# Patient Record
Sex: MALE | Race: BLACK OR AFRICAN AMERICAN | Employment: UNEMPLOYED | URBAN - METROPOLITAN AREA
[De-identification: names, ages, dates, MRNs, and addresses within clinical notes are randomized per-mention and may not be internally consistent; named-entity substitution may affect disease eponyms.]

---

## 2017-09-06 ENCOUNTER — HOSPITAL ENCOUNTER (OUTPATIENT)
Dept: OTHER | Age: 62
Discharge: HOME | End: 2017-09-06
Attending: INTERNAL MEDICINE | Admitting: INTERNAL MEDICINE

## 2017-09-06 DIAGNOSIS — M79.642 LEFT HAND PAIN: ICD-10-CM

## 2021-12-15 ENCOUNTER — APPOINTMENT (OUTPATIENT)
Dept: CT IMAGING | Age: 66
End: 2021-12-15
Payer: MEDICARE

## 2021-12-15 ENCOUNTER — APPOINTMENT (OUTPATIENT)
Dept: GENERAL RADIOLOGY | Age: 66
End: 2021-12-15
Payer: MEDICARE

## 2021-12-15 ENCOUNTER — HOSPITAL ENCOUNTER (EMERGENCY)
Age: 66
Discharge: HOME | End: 2021-12-15
Payer: MEDICARE

## 2021-12-15 VITALS
BODY MASS INDEX: 39.17 KG/M2 | OXYGEN SATURATION: 96 % | SYSTOLIC BLOOD PRESSURE: 165 MMHG | WEIGHT: 315 LBS | HEART RATE: 70 BPM | DIASTOLIC BLOOD PRESSURE: 96 MMHG | RESPIRATION RATE: 20 BRPM | HEIGHT: 75 IN | TEMPERATURE: 98.3 F

## 2021-12-15 DIAGNOSIS — H81.11 BENIGN PAROXYSMAL POSITIONAL VERTIGO OF RIGHT EAR: Primary | ICD-10-CM

## 2021-12-15 LAB
A/G RATIO: 1.1 (ref 1.1–2.2)
ALBUMIN SERPL-MCNC: 4 G/DL (ref 3.4–5)
ALP BLD-CCNC: 85 U/L (ref 40–129)
ALT SERPL-CCNC: 11 U/L (ref 10–40)
ANION GAP SERPL CALCULATED.3IONS-SCNC: 14 MMOL/L (ref 3–16)
AST SERPL-CCNC: 19 U/L (ref 15–37)
BASOPHILS ABSOLUTE: 0.1 K/UL (ref 0–0.2)
BASOPHILS RELATIVE PERCENT: 0.7 %
BILIRUB SERPL-MCNC: 0.4 MG/DL (ref 0–1)
BUN BLDV-MCNC: 30 MG/DL (ref 7–20)
CALCIUM SERPL-MCNC: 9.4 MG/DL (ref 8.3–10.6)
CHLORIDE BLD-SCNC: 105 MMOL/L (ref 99–110)
CO2: 19 MMOL/L (ref 21–32)
CREAT SERPL-MCNC: 1.6 MG/DL (ref 0.8–1.3)
EKG ATRIAL RATE: 68 BPM
EKG DIAGNOSIS: NORMAL
EKG P AXIS: 20 DEGREES
EKG P-R INTERVAL: 154 MS
EKG Q-T INTERVAL: 414 MS
EKG QRS DURATION: 78 MS
EKG QTC CALCULATION (BAZETT): 440 MS
EKG R AXIS: 65 DEGREES
EKG T AXIS: 168 DEGREES
EKG VENTRICULAR RATE: 68 BPM
EOSINOPHILS ABSOLUTE: 0.2 K/UL (ref 0–0.6)
EOSINOPHILS RELATIVE PERCENT: 1.4 %
GFR AFRICAN AMERICAN: 53
GFR NON-AFRICAN AMERICAN: 44
GLUCOSE BLD-MCNC: 120 MG/DL
GLUCOSE BLD-MCNC: 120 MG/DL (ref 70–99)
GLUCOSE BLD-MCNC: 127 MG/DL (ref 70–99)
HCT VFR BLD CALC: 45.9 % (ref 40.5–52.5)
HEMOGLOBIN: 15.4 G/DL (ref 13.5–17.5)
LIPASE: 56 U/L (ref 13–60)
LYMPHOCYTES ABSOLUTE: 2.1 K/UL (ref 1–5.1)
LYMPHOCYTES RELATIVE PERCENT: 19.6 %
MCH RBC QN AUTO: 31.6 PG (ref 26–34)
MCHC RBC AUTO-ENTMCNC: 33.5 G/DL (ref 31–36)
MCV RBC AUTO: 94.3 FL (ref 80–100)
MONOCYTES ABSOLUTE: 0.6 K/UL (ref 0–1.3)
MONOCYTES RELATIVE PERCENT: 5.7 %
NEUTROPHILS ABSOLUTE: 7.7 K/UL (ref 1.7–7.7)
NEUTROPHILS RELATIVE PERCENT: 72.6 %
PDW BLD-RTO: 13.3 % (ref 12.4–15.4)
PERFORMED ON: ABNORMAL
PLATELET # BLD: 265 K/UL (ref 135–450)
PMV BLD AUTO: 8.3 FL (ref 5–10.5)
POTASSIUM REFLEX MAGNESIUM: 4.4 MMOL/L (ref 3.5–5.1)
RBC # BLD: 4.87 M/UL (ref 4.2–5.9)
SODIUM BLD-SCNC: 138 MMOL/L (ref 136–145)
TOTAL PROTEIN: 7.7 G/DL (ref 6.4–8.2)
TROPONIN: <0.01 NG/ML
WBC # BLD: 10.5 K/UL (ref 4–11)

## 2021-12-15 PROCEDURE — 80053 COMPREHEN METABOLIC PANEL: CPT

## 2021-12-15 PROCEDURE — 96374 THER/PROPH/DIAG INJ IV PUSH: CPT

## 2021-12-15 PROCEDURE — 6360000002 HC RX W HCPCS: Performed by: PHYSICIAN ASSISTANT

## 2021-12-15 PROCEDURE — 70450 CT HEAD/BRAIN W/O DYE: CPT

## 2021-12-15 PROCEDURE — 6370000000 HC RX 637 (ALT 250 FOR IP): Performed by: PHYSICIAN ASSISTANT

## 2021-12-15 PROCEDURE — 93005 ELECTROCARDIOGRAM TRACING: CPT | Performed by: PHYSICIAN ASSISTANT

## 2021-12-15 PROCEDURE — 84484 ASSAY OF TROPONIN QUANT: CPT

## 2021-12-15 PROCEDURE — 85025 COMPLETE CBC W/AUTO DIFF WBC: CPT

## 2021-12-15 PROCEDURE — 83690 ASSAY OF LIPASE: CPT

## 2021-12-15 PROCEDURE — 93010 ELECTROCARDIOGRAM REPORT: CPT | Performed by: INTERNAL MEDICINE

## 2021-12-15 PROCEDURE — 99284 EMERGENCY DEPT VISIT MOD MDM: CPT

## 2021-12-15 PROCEDURE — 2580000003 HC RX 258: Performed by: PHYSICIAN ASSISTANT

## 2021-12-15 PROCEDURE — 71045 X-RAY EXAM CHEST 1 VIEW: CPT

## 2021-12-15 RX ORDER — MECLIZINE HYDROCHLORIDE 50 MG/1
50 TABLET ORAL 3 TIMES DAILY PRN
Qty: 30 TABLET | Refills: 0 | Status: SHIPPED | OUTPATIENT
Start: 2021-12-15 | End: 2021-12-25

## 2021-12-15 RX ORDER — MECLIZINE HCL 12.5 MG/1
25 TABLET ORAL ONCE
Status: COMPLETED | OUTPATIENT
Start: 2021-12-15 | End: 2021-12-15

## 2021-12-15 RX ORDER — ONDANSETRON 4 MG/1
4 TABLET, ORALLY DISINTEGRATING ORAL EVERY 8 HOURS PRN
Qty: 20 TABLET | Refills: 0 | Status: SHIPPED | OUTPATIENT
Start: 2021-12-15

## 2021-12-15 RX ORDER — FLUTICASONE PROPIONATE 50 MCG
2 SPRAY, SUSPENSION (ML) NASAL DAILY
Qty: 16 G | Refills: 0 | Status: SHIPPED | OUTPATIENT
Start: 2021-12-15

## 2021-12-15 RX ORDER — 0.9 % SODIUM CHLORIDE 0.9 %
1000 INTRAVENOUS SOLUTION INTRAVENOUS ONCE
Status: COMPLETED | OUTPATIENT
Start: 2021-12-15 | End: 2021-12-15

## 2021-12-15 RX ORDER — ONDANSETRON 2 MG/ML
4 INJECTION INTRAMUSCULAR; INTRAVENOUS ONCE
Status: COMPLETED | OUTPATIENT
Start: 2021-12-15 | End: 2021-12-15

## 2021-12-15 RX ADMIN — MECLIZINE 25 MG: 12.5 TABLET ORAL at 12:40

## 2021-12-15 RX ADMIN — ONDANSETRON 4 MG: 2 INJECTION INTRAMUSCULAR; INTRAVENOUS at 12:40

## 2021-12-15 RX ADMIN — SODIUM CHLORIDE 1000 ML: 9 INJECTION, SOLUTION INTRAVENOUS at 12:40

## 2021-12-15 ASSESSMENT — PAIN SCALES - GENERAL: PAINLEVEL_OUTOF10: 0

## 2021-12-15 NOTE — ED NOTES
Pt arrived to room 15 from triage, pt states that he woke up this am feeling dizzy, pt states \"feel like the room is spinning\" pt is alert and orient vss afebrile, pt ambulated to the bed from wheelchair with a steady gait, pt lives at home with family and does not use a walker or cane to ambulate, pt denies pain     Glenis Cheney RN  12/15/21 4618

## 2021-12-15 NOTE — ED PROVIDER NOTES
[facility]        Pt Name: Caroline Lewis  MRN: [id number]  [place] 1955  Date of evaluation: 12/15/2021  Provider: Alyssa Valero PA-C  PCP: Janie Blackwood MD  Note Started: 2:10 PM EST      ZEB. I have evaluated this patient. My supervising physician was available for consultation. Triage CHIEF COMPLAINT       Chief Complaint   Patient presents with    Dizziness     started when he woke up at 0700    Nausea     has not thrown up         HISTORY OF PRESENT ILLNESS   (Location/Symptom, Timing/Onset, Context/Setting, Quality, Duration, Modifying Factors, Severity)  Note limiting factors. Chief Complaint: Dizziness and nausea without vomiting just today at 7 AM.    Caroline Lewis is a 61 y.o. male who presents stating that he was doing fine yesterday through the day and went to bed night yesterday as normal.  However when he started to get up this morning he states that became very dizzy and nauseated in both the symptoms have persisted worse with changing resistance and better at rest ever since. No recent runny or stuffy nose or cough or congestion compared to usual.  No headache vision change neck pain or stiffness shortness of breath or chest pain heart palpitations. Patient is diabetic and uses oral medications to control his glucose and does not check his glucose. No vomiting or diarrhea or belly pain or extremity acute weakness or loss of sensation or movement or rash. Patient decided to come in to be seen and evaluated and treated in the ER because his symptoms are quite bothersome. Nursing Notes were all reviewed and agreed with or any disagreements were addressed in the HPI. REVIEW OF SYSTEMS    (2-9 systems for level 4, 10 or more for level 5)     Review of Systems  Positive straight as above with feeling like the room spins when he is changing position and settles down when he sits still. No headache or vision changes neck pain or stiffness. No shortness of breath or chest pain or heart palpitations. Positive for nausea with no vomiting and no diarrhea, no abdominal pain or distention or tenderness. No burning in urination or difficult passing urine or acute stool changes. No extremity weakness or loss of sensation or movement or coordination however patient feels that the dizziness makes it more difficult to ambulate. No rash. No difficulty speaking or understanding speech. PAST MEDICAL HISTORY     Past Medical History:   Diagnosis Date    Gout     Hyperlipidemia     Hypertension     Low testosterone     Osteoarthritis of left hip     Vitamin D deficiency        SURGICAL HISTORY   History reviewed. No pertinent surgical history. CURRENTMEDICATIONS       Previous Medications    ALLOPURINOL (ZYLOPRIM) 300 MG TABLET    Take 1 tablet by mouth daily    AMLODIPINE (NORVASC) 5 MG TABLET    Take 1 tablet by mouth daily    ATENOLOL (TENORMIN) 50 MG TABLET    Take 2 tablets by mouth daily    CELECOXIB (CELEBREX) 200 MG CAPSULE    Take 1 capsule by mouth 2 times daily    INDOMETHACIN (INDOCIN) 50 MG CAPSULE    Take 1 capsule by mouth 3 times daily    LISINOPRIL (PRINIVIL;ZESTRIL) 20 MG TABLET    Take 1.5 tablets by mouth daily    TADALAFIL (CIALIS) 20 MG TABLET    Take 1 tablet by mouth as needed for Erectile Dysfunction    VITAMIN D (ERGOCALCIFEROL) 66304 UNITS CAPS CAPSULE    Take 1 capsule by mouth once a week       ALLERGIES     Patient has no known allergies. FAMILYHISTORY     History reviewed. No pertinent family history.      SOCIAL HISTORY       Social History     Socioeconomic History    Marital status:      Spouse name: None    Number of children: None    Years of education: None    Highest education level: None   Occupational History    None   Tobacco Use    Smoking status: Never Smoker    Smokeless tobacco: Never Used   Substance and Sexual Activity    Alcohol use: No    Drug use: No    Sexual activity: Yes     Partners: Female   Other Topics Concern    None   Social History Narrative    None     Social Determinants of Health     Financial Resource Strain:     Difficulty of Paying Living Expenses: Not on file   Food Insecurity:     Worried About Running Out of Food in the Last Year: Not on file    Shazia of Food in the Last Year: Not on file   Transportation Needs:     Lack of Transportation (Medical): Not on file    Lack of Transportation (Non-Medical):  Not on file   Physical Activity:     Days of Exercise per Week: Not on file    Minutes of Exercise per Session: Not on file   Stress:     Feeling of Stress : Not on file   Social Connections:     Frequency of Communication with Friends and Family: Not on file    Frequency of Social Gatherings with Friends and Family: Not on file    Attends Muslim Services: Not on file    Active Member of [address] [place] [address] [employer] or Organizations: Not on file    Attends Club or Organization Meetings: Not on file    Marital Status: Not on file   Intimate Partner Violence:     Fear of Current or Ex-Partner: Not on file    Emotionally Abused: Not on file    Physically Abused: Not on file    Sexually Abused: Not on file   Housing Stability:     Unable to Pay for Housing in the Last Year: Not on file    Number of [place] in the Last Year: Not on file    Unstable Housing in the Last Year: Not on file       SCREENINGS    Giulia Coma Scale  Eye Opening: Spontaneous  Best Verbal Response: Oriented  Best Motor Response: Obeys commands  Giulia Coma Scale Score: 15        PHYSICAL EXAM    (up to 7 for level 4, 8 or more for level 5)     ED Triage Vitals   BP Temp Temp Source Pulse Resp SpO2 Height Weight   12/15/21 1119 12/15/21 1119 12/15/21 1119 12/15/21 1119 12/15/21 1121 12/15/21 1119 12/15/21 1119 12/15/21 1200   (!) 165/96 98.3 °F (36.8 °C) Temporal 70 20 96 % 6' 3\" (1.905 m) (!) 333 lb 12.4 oz (151.4 kg)       Physical for horizontal nystagmus elicited in both eyes with nausea and vomiting with rapid head tilt from right to left initially. The nystagmus settles down within about 20 seconds. Psychiatric:         Mood and Affect: Mood normal.         Behavior: Behavior normal.         DIAGNOSTIC RESULTS   LABS:    Labs Reviewed   COMPREHENSIVE METABOLIC PANEL W/ REFLEX TO MG FOR LOW K - Abnormal; Notable for the following components:       Result Value    CO2 19 (*)     Glucose 127 (*)     BUN 30 (*)     CREATININE 1.6 (*)     GFR Non- 44 (*)     GFR  48 (*)     All other components within normal limits    Narrative:     Performed at:  [facility]  [address], [facility] [place] [address]   Phone [phone]   POCT GLUCOSE - Abnormal; Notable for the following components:    POC Glucose 120 (*)     All other components within normal limits    Narrative:     Performed at:  [place] [address], [facility] [place] [address]   Phone [phone]   POCT GLUCOSE - Normal   CBC WITH AUTO DIFFERENTIAL    Narrative:     Performed at:  [address] [facility] [place] [address]   Phone [phone]   TROPONIN    Narrative:     Performed at:  [facility]  [address] [place] [address] [facility] [place] [address]   Phone [phone]   LIPASE    Narrative:     Performed at:  [facility]  [address] [place] [address] [place] [address]   Phone [phone]       When ordered, only abnormal lab results are displayed. All other labs were within normal range or not returned as of this dictation. EKG: When ordered, EKG's are interpreted by the Emergency Department Physician in the absence of a cardiologist.  Please see their note for interpretation of EKG.       RADIOLOGY:   Non-plain film images such as CT, Ultrasound and MRI are read by the yves Roblero radiographic images are visualized andpreliminarily interpreted by the  ED Provider with the below findings:        Interpretation [facility] Radiologist below, if available at the time of this note:    CT HEAD WO CONTRAST   Final Result   No acute intracranial abnormality. RECOMMENDATIONS:   Unavailable         XR CHEST PORTABLE   Final Result   No acute process. CT HEAD WO CONTRAST    Result Date: 12/15/2021  EXAMINATION: CT OF THE HEAD WITHOUT CONTRAST  12/15/2021 12:38 pm TECHNIQUE: CT of the head was performed without the administration of intravenous contrast. Dose modulation, iterative reconstruction, and/or weight based adjustment of the mA/kV was utilized to reduce the radiation dose to as low as reasonably achievable. COMPARISON: None. HISTORY: ORDERING SYSTEM PROVIDED HISTORY: dizziness TECHNOLOGIST PROVIDED HISTORY: Reason for exam:->dizziness Has a \"code stroke\" or \"stroke alert\" been called? ->No Decision Support Exception - unselect if not a suspected or confirmed emergency medical condition->Emergency Medical Condition (MA) Reason for Exam: dizzy FINDINGS: BRAIN/VENTRICLES: The ventricles and sulci are prominent. Pattern is consistent with age-related atrophy. No extra-axial fluid collections, and no sign of recent intracranial hemorrhage. Decreased attenuation is noted within the periventricular white matter. Pattern is consistent with chronic small vessel ischemic change. No acute edema or mass effect. No mass lesions are detected. ORBITS: The visualized portion of the orbits demonstrate no acute abnormality. SINUSES: The visualized paranasal sinuses and mastoid air cells demonstrate no acute abnormality. SOFT TISSUES/SKULL:  No acute abnormality of the visualized skull or soft tissues. No acute intracranial abnormality.  RECOMMENDATIONS: Unavailable     XR CHEST PORTABLE    Result Date: 12/15/2021  EXAMINATION: ONE XRAY VIEW OF THE CHEST 12/15/2021 12:03 pm COMPARISON: 10/16/2012 HISTORY: ORDERING SYSTEM PROVIDED HISTORY: other TECHNOLOGIST PROVIDED HISTORY: Reason for exam:->other Reason for Exam: dizziness,nausea,sob FINDINGS: The lungs are without acute focal process. There is no effusion or pneumothorax. The cardiomediastinal silhouette is without acute process. The osseous structures are without acute process. No acute process. PROCEDURES   Unless otherwise noted below, none     Procedures    CRITICAL CARE TIME   N/A    CONSULTS:  None      EMERGENCY DEPARTMENT COURSE and DIFFERENTIAL DIAGNOSIS/MDM:   Vitals:    Vitals:    12/15/21 1119 12/15/21 1121 12/15/21 1200   BP: (!) 165/96     Pulse: 70     Resp:  20    Temp: 98.3 °F (36.8 °C)     TempSrc: Temporal     SpO2: 96%     Weight:   (!) 333 lb 12.4 oz (151.4 kg)   Height: 6' 3\" (1.905 m)         Patient was given thefollowing medications:  Medications   0.9 % sodium chloride bolus (1,000 mLs IntraVENous New Bag 12/15/21 1240)   ondansetron (ZOFRAN) injection 4 mg (4 mg IntraVENous Given 12/15/21 1240)   meclizine (ANTIVERT) tablet 25 mg (25 mg Oral Given 12/15/21 1240)         Patient presents as above and evaluation and treatment is begun. After exam, medications given for comfort including some IV fluids as well. Patient based on the lab work does have some ongoing kidney disease seen in the electronic medical record with today's BUN and creatinine not significantly worse than usual.  Patient does not elevated troponin according to the labs and has no acute EKG changes of concerned from the previous EKG on file per ED physician. Please see that note for details. CBC normal as well as lipase. Glucose not concerning at this time in this diabetic patient. CT head plain as well as 1 view chest x-ray obtained and also returned as above. On repeat exam patient symptoms are significantly improved after the antinausea medication and IV fluids and meclizine.   Patient's signs symptoms and findings here are all consistent with peripheral benign paroxysmal positional vertigo of the right ear. We will write for supportive medications at home. Patient is educated concerning the natural course of this issue and we will also be given follow-up instructions and other supportive information that may help his symptoms resolved. He verbalizes understanding and agreement with the above and the following discharge home plan. Home in stable condition to drink plenty of water, use medications as written, and rest.  Slowly advance activity as tolerated and consider trying the Epley maneuver at home. Monitor for gradual improvement over the next several days and follow-up outpatient with family doctor and ENT for further care and treatment if needed. Return to the emergency department for any emergency worsening or concern     FINAL IMPRESSION      1. Benign paroxysmal positional vertigo of right ear          DISPOSITION/PLAN   DISPOSITION Discharge - Pending Orders Complete 12/15/2021 02:10:45 PM      PATIENT REFERREDTO:  [address] [place] [address] ENT  [address] [place] [address] Drive. [facility] [address]  Schedule an appointment as soon as possible for a visit       Efraín Mohan MD  [address] [place] [address]  [phone]    Call         DISCHARGE MEDICATIONS:  New Prescriptions    FLUTICASONE (FLONASE) 50 MCG/ACT NASAL SPRAY    2 sprays by Each Nostril route daily for the first 3 days, then decrease to 1 spray in each nostril once daily.     MECLIZINE (ANTIVERT) 50 MG TABLET    Take 1 tablet by mouth 3 times daily as needed for Dizziness    ONDANSETRON (ZOFRAN ODT) 4 MG DISINTEGRATING TABLET    Take 1 tablet by mouth every 8 hours as needed for Nausea       DISCONTINUED MEDICATIONS:  Discontinued Medications    No medications on file              (Please note that portions ofthis note were completed with a voice recognition program.  Efforts were made to edit the dictations but occasionally words are

## 2021-12-15 NOTE — ED PROVIDER NOTES
EKG: Normal sinus rhythm, rate of 68, inferior lateral ischemic changes. Rhythm strip shows a sinus rhythm with a rate of 68, IA interval 154 ms, QRS 78 ms with no other ectopy as interpreted by me. This compared to an EKG dated 10/15/2013, no significant change noted.       Dipesh Fregoso MD  12/15/21 6183

## 2021-12-15 NOTE — ED NOTES
Pt discharged at this time. Discharge instructions and medications reviewed,  Questions were answered. PT verbalized understanding. VSS, Afebrile. Follow up appointments were discussed.          Morena Hu RN  12/15/21 9570

## 2021-12-15 NOTE — ED NOTES
Pt resting in bed at this time, laying in a supine position with head of bed elevated . Call light remains in reach instructed pt how to use, and encouraged pt to call if needed assistance, no distress noted. RR even and unlabored, skin warm and dry. No needs at this time. Will continue to monitor closely.          Ronni Goins RN  12/15/21 3063

## 2021-12-15 NOTE — ED NOTES
Pt resting in bed at this time, laying in a supine position with head of bed elevated . Call light remains in reach instructed pt how to use, and encouraged pt to call if needed assistance, no distress noted. RR even and unlabored, skin warm and dry. No needs at this time. Will continue to monitor closely.          Teena Miranda RN  12/15/21 2292

## 2025-05-14 ENCOUNTER — ANESTHESIA (OUTPATIENT)
Dept: OPERATING ROOM | Age: 70
End: 2025-05-14
Payer: MEDICARE

## 2025-05-14 ENCOUNTER — ANESTHESIA EVENT (OUTPATIENT)
Dept: OPERATING ROOM | Age: 70
End: 2025-05-14
Payer: MEDICARE

## 2025-05-14 ENCOUNTER — HOSPITAL ENCOUNTER (EMERGENCY)
Age: 70
Discharge: HOME | End: 2025-05-14
Payer: MEDICARE

## 2025-05-14 ENCOUNTER — APPOINTMENT (OUTPATIENT)
Dept: GENERAL RADIOLOGY | Age: 70
End: 2025-05-14
Payer: MEDICARE

## 2025-05-14 VITALS
TEMPERATURE: 96.9 F | BODY MASS INDEX: 38.48 KG/M2 | HEART RATE: 81 BPM | OXYGEN SATURATION: 98 % | DIASTOLIC BLOOD PRESSURE: 81 MMHG | SYSTOLIC BLOOD PRESSURE: 137 MMHG | RESPIRATION RATE: 15 BRPM | WEIGHT: 299.83 LBS | HEIGHT: 74 IN

## 2025-05-14 DIAGNOSIS — T17.208A FOREIGN BODY IN THROAT, INITIAL ENCOUNTER: Primary | ICD-10-CM

## 2025-05-14 LAB
ANION GAP SERPL CALCULATED.3IONS-SCNC: 10 MMOL/L (ref 3–16)
BASOPHILS # BLD: 0.1 K/UL (ref 0–0.2)
BASOPHILS NFR BLD: 1 %
BUN SERPL-MCNC: 28 MG/DL (ref 7–20)
CALCIUM SERPL-MCNC: 8.9 MG/DL (ref 8.3–10.6)
CHLORIDE SERPL-SCNC: 107 MMOL/L (ref 99–110)
CO2 SERPL-SCNC: 19 MMOL/L (ref 21–32)
CREAT SERPL-MCNC: 2 MG/DL (ref 0.8–1.3)
DEPRECATED RDW RBC AUTO: 13.9 % (ref 12.4–15.4)
EOSINOPHIL # BLD: 0.3 K/UL (ref 0–0.6)
EOSINOPHIL NFR BLD: 4.4 %
GFR SERPLBLD CREATININE-BSD FMLA CKD-EPI: 36 ML/MIN/{1.73_M2}
GLUCOSE SERPL-MCNC: 118 MG/DL (ref 70–99)
HCT VFR BLD AUTO: 40.3 % (ref 40.5–52.5)
HGB BLD-MCNC: 13.8 G/DL (ref 13.5–17.5)
LYMPHOCYTES # BLD: 2.3 K/UL (ref 1–5.1)
LYMPHOCYTES NFR BLD: 29.2 %
MCH RBC QN AUTO: 32.1 PG (ref 26–34)
MCHC RBC AUTO-ENTMCNC: 34.3 G/DL (ref 31–36)
MCV RBC AUTO: 93.7 FL (ref 80–100)
MONOCYTES # BLD: 0.7 K/UL (ref 0–1.3)
MONOCYTES NFR BLD: 9.2 %
NEUTROPHILS # BLD: 4.3 K/UL (ref 1.7–7.7)
NEUTROPHILS NFR BLD: 56.2 %
PLATELET # BLD AUTO: 210 K/UL (ref 135–450)
PMV BLD AUTO: 8.4 FL (ref 5–10.5)
POTASSIUM SERPL-SCNC: 4.4 MMOL/L (ref 3.5–5.1)
RBC # BLD AUTO: 4.3 M/UL (ref 4.2–5.9)
SODIUM SERPL-SCNC: 136 MMOL/L (ref 136–145)
WBC # BLD AUTO: 7.7 K/UL (ref 4–11)

## 2025-05-14 PROCEDURE — 36415 COLL VENOUS BLD VENIPUNCTURE: CPT

## 2025-05-14 PROCEDURE — 3600000004 HC SURGERY LEVEL 4 BASE: Performed by: STUDENT IN AN ORGANIZED HEALTH CARE EDUCATION/TRAINING PROGRAM

## 2025-05-14 PROCEDURE — 6360000002 HC RX W HCPCS: Performed by: NURSE ANESTHETIST, CERTIFIED REGISTERED

## 2025-05-14 PROCEDURE — 7100000010 HC PHASE II RECOVERY - FIRST 15 MIN: Performed by: STUDENT IN AN ORGANIZED HEALTH CARE EDUCATION/TRAINING PROGRAM

## 2025-05-14 PROCEDURE — 2580000003 HC RX 258: Performed by: NURSE ANESTHETIST, CERTIFIED REGISTERED

## 2025-05-14 PROCEDURE — 80048 BASIC METABOLIC PNL TOTAL CA: CPT

## 2025-05-14 PROCEDURE — 70360 X-RAY EXAM OF NECK: CPT

## 2025-05-14 PROCEDURE — 85025 COMPLETE CBC W/AUTO DIFF WBC: CPT

## 2025-05-14 PROCEDURE — 3700000000 HC ANESTHESIA ATTENDED CARE: Performed by: STUDENT IN AN ORGANIZED HEALTH CARE EDUCATION/TRAINING PROGRAM

## 2025-05-14 PROCEDURE — 99285 EMERGENCY DEPT VISIT HI MDM: CPT | Performed by: STUDENT IN AN ORGANIZED HEALTH CARE EDUCATION/TRAINING PROGRAM

## 2025-05-14 PROCEDURE — 7100000000 HC PACU RECOVERY - FIRST 15 MIN: Performed by: STUDENT IN AN ORGANIZED HEALTH CARE EDUCATION/TRAINING PROGRAM

## 2025-05-14 PROCEDURE — 2500000003 HC RX 250 WO HCPCS: Performed by: NURSE ANESTHETIST, CERTIFIED REGISTERED

## 2025-05-14 PROCEDURE — 3600000014 HC SURGERY LEVEL 4 ADDTL 15MIN: Performed by: STUDENT IN AN ORGANIZED HEALTH CARE EDUCATION/TRAINING PROGRAM

## 2025-05-14 PROCEDURE — 3700000001 HC ADD 15 MINUTES (ANESTHESIA): Performed by: STUDENT IN AN ORGANIZED HEALTH CARE EDUCATION/TRAINING PROGRAM

## 2025-05-14 PROCEDURE — 31530 LARYNGOSCOPY W/FB REMOVAL: CPT | Performed by: STUDENT IN AN ORGANIZED HEALTH CARE EDUCATION/TRAINING PROGRAM

## 2025-05-14 PROCEDURE — 99284 EMERGENCY DEPT VISIT MOD MDM: CPT

## 2025-05-14 PROCEDURE — 7100000001 HC PACU RECOVERY - ADDTL 15 MIN: Performed by: STUDENT IN AN ORGANIZED HEALTH CARE EDUCATION/TRAINING PROGRAM

## 2025-05-14 PROCEDURE — 2709999900 HC NON-CHARGEABLE SUPPLY: Performed by: STUDENT IN AN ORGANIZED HEALTH CARE EDUCATION/TRAINING PROGRAM

## 2025-05-14 RX ORDER — LIDOCAINE HYDROCHLORIDE 20 MG/ML
INJECTION, SOLUTION EPIDURAL; INFILTRATION; INTRACAUDAL; PERINEURAL
Status: DISCONTINUED | OUTPATIENT
Start: 2025-05-14 | End: 2025-05-14 | Stop reason: SDUPTHER

## 2025-05-14 RX ORDER — FENTANYL CITRATE 50 UG/ML
50 INJECTION, SOLUTION INTRAMUSCULAR; INTRAVENOUS EVERY 5 MIN PRN
Status: DISCONTINUED | OUTPATIENT
Start: 2025-05-14 | End: 2025-05-14 | Stop reason: HOSPADM

## 2025-05-14 RX ORDER — ONDANSETRON 2 MG/ML
4 INJECTION INTRAMUSCULAR; INTRAVENOUS
Status: DISCONTINUED | OUTPATIENT
Start: 2025-05-14 | End: 2025-05-14 | Stop reason: HOSPADM

## 2025-05-14 RX ORDER — SODIUM CHLORIDE 9 MG/ML
INJECTION, SOLUTION INTRAVENOUS PRN
Status: DISCONTINUED | OUTPATIENT
Start: 2025-05-14 | End: 2025-05-14 | Stop reason: HOSPADM

## 2025-05-14 RX ORDER — DIPHENHYDRAMINE HYDROCHLORIDE 50 MG/ML
12.5 INJECTION, SOLUTION INTRAMUSCULAR; INTRAVENOUS
Status: DISCONTINUED | OUTPATIENT
Start: 2025-05-14 | End: 2025-05-14 | Stop reason: HOSPADM

## 2025-05-14 RX ORDER — GLYCOPYRROLATE 0.2 MG/ML
INJECTION INTRAMUSCULAR; INTRAVENOUS
Status: DISCONTINUED | OUTPATIENT
Start: 2025-05-14 | End: 2025-05-14 | Stop reason: SDUPTHER

## 2025-05-14 RX ORDER — NALOXONE HYDROCHLORIDE 0.4 MG/ML
INJECTION, SOLUTION INTRAMUSCULAR; INTRAVENOUS; SUBCUTANEOUS PRN
Status: DISCONTINUED | OUTPATIENT
Start: 2025-05-14 | End: 2025-05-14 | Stop reason: HOSPADM

## 2025-05-14 RX ORDER — SUCCINYLCHOLINE/SOD CL,ISO/PF 200MG/10ML
SYRINGE (ML) INTRAVENOUS
Status: DISCONTINUED | OUTPATIENT
Start: 2025-05-14 | End: 2025-05-14 | Stop reason: SDUPTHER

## 2025-05-14 RX ORDER — SODIUM CHLORIDE 9 MG/ML
INJECTION, SOLUTION INTRAVENOUS
Status: DISCONTINUED | OUTPATIENT
Start: 2025-05-14 | End: 2025-05-14 | Stop reason: SDUPTHER

## 2025-05-14 RX ORDER — SODIUM CHLORIDE 0.9 % (FLUSH) 0.9 %
5-40 SYRINGE (ML) INJECTION PRN
Status: DISCONTINUED | OUTPATIENT
Start: 2025-05-14 | End: 2025-05-14 | Stop reason: HOSPADM

## 2025-05-14 RX ORDER — ONDANSETRON 2 MG/ML
INJECTION INTRAMUSCULAR; INTRAVENOUS
Status: DISCONTINUED | OUTPATIENT
Start: 2025-05-14 | End: 2025-05-14 | Stop reason: SDUPTHER

## 2025-05-14 RX ORDER — DEXAMETHASONE SODIUM PHOSPHATE 4 MG/ML
INJECTION, SOLUTION INTRA-ARTICULAR; INTRALESIONAL; INTRAMUSCULAR; INTRAVENOUS; SOFT TISSUE
Status: DISCONTINUED | OUTPATIENT
Start: 2025-05-14 | End: 2025-05-14 | Stop reason: SDUPTHER

## 2025-05-14 RX ORDER — LORAZEPAM 2 MG/ML
0.5 INJECTION INTRAMUSCULAR
Status: DISCONTINUED | OUTPATIENT
Start: 2025-05-14 | End: 2025-05-14 | Stop reason: HOSPADM

## 2025-05-14 RX ORDER — HALOPERIDOL 5 MG/ML
1 INJECTION INTRAMUSCULAR
Status: DISCONTINUED | OUTPATIENT
Start: 2025-05-14 | End: 2025-05-14 | Stop reason: HOSPADM

## 2025-05-14 RX ORDER — SODIUM CHLORIDE 0.9 % (FLUSH) 0.9 %
5-40 SYRINGE (ML) INJECTION EVERY 12 HOURS SCHEDULED
Status: DISCONTINUED | OUTPATIENT
Start: 2025-05-14 | End: 2025-05-14 | Stop reason: HOSPADM

## 2025-05-14 RX ORDER — PROPOFOL 10 MG/ML
INJECTION, EMULSION INTRAVENOUS
Status: DISCONTINUED | OUTPATIENT
Start: 2025-05-14 | End: 2025-05-14 | Stop reason: SDUPTHER

## 2025-05-14 RX ADMIN — GLYCOPYRROLATE 0.2 MG: 0.2 INJECTION INTRAMUSCULAR; INTRAVENOUS at 19:14

## 2025-05-14 RX ADMIN — SODIUM CHLORIDE: 9 INJECTION, SOLUTION INTRAVENOUS at 19:07

## 2025-05-14 RX ADMIN — ONDANSETRON 4 MG: 2 INJECTION, SOLUTION INTRAMUSCULAR; INTRAVENOUS at 19:14

## 2025-05-14 RX ADMIN — PROPOFOL 100 MG: 10 INJECTION, EMULSION INTRAVENOUS at 19:13

## 2025-05-14 RX ADMIN — PROPOFOL 100 MG: 10 INJECTION, EMULSION INTRAVENOUS at 19:16

## 2025-05-14 RX ADMIN — LIDOCAINE HYDROCHLORIDE 60 MG: 20 INJECTION, SOLUTION EPIDURAL; INFILTRATION; INTRACAUDAL; PERINEURAL at 19:12

## 2025-05-14 RX ADMIN — DEXAMETHASONE SODIUM PHOSPHATE 8 MG: 4 INJECTION, SOLUTION INTRAMUSCULAR; INTRAVENOUS at 19:14

## 2025-05-14 RX ADMIN — Medication 200 MG: at 19:12

## 2025-05-14 RX ADMIN — PROPOFOL 200 MG: 10 INJECTION, EMULSION INTRAVENOUS at 19:12

## 2025-05-14 ASSESSMENT — PAIN SCALES - GENERAL
PAINLEVEL_OUTOF10: 0
PAINLEVEL_OUTOF10: 5
PAINLEVEL_OUTOF10: 0
PAINLEVEL_OUTOF10: 0

## 2025-05-14 ASSESSMENT — PAIN - FUNCTIONAL ASSESSMENT
PAIN_FUNCTIONAL_ASSESSMENT: 0-10
PAIN_FUNCTIONAL_ASSESSMENT: 0-10

## 2025-05-14 ASSESSMENT — PAIN DESCRIPTION - LOCATION: LOCATION: THROAT

## 2025-05-14 ASSESSMENT — ENCOUNTER SYMPTOMS: SHORTNESS OF BREATH: 0

## 2025-05-14 ASSESSMENT — LIFESTYLE VARIABLES: SMOKING_STATUS: 0

## 2025-05-14 NOTE — ED PROVIDER NOTES
[facility]  EMERGENCY DEPARTMENT ENCOUNTER        Pt Name: Dino Nguyen  MRN: [id number]  Birthdate 1955  Date of evaluation: 5/14/2025  Provider: MAKEDA Harris  PCP: Madi Ny MD  Note Started: 4:56 PM EDT 5/14/25      ZEB. I have evaluated this patient.        CHIEF COMPLAINT       Chief Complaint   Patient presents with    Bone Stuck in Throat     Pt swallowed part of fish bone around 10 minutes ago. Pt says that bone is stuck on right side of throat and is making it painful to swallow. Pt denies shortness of breath and no signs of distress.        HISTORY OF PRESENT ILLNESS: 1 or more Elements     History From: patient  Limitations to history : None    Dino Nguyen is a 70 y.o. male who presents to the emergency room due to concern for fishbone stuck in his throat.  Patient states that he was eating fish earlier prior to arrival when he felt a bug stuck at the right side of his throat.  Feels a sharp stabbing type pain at times.  He is able to tolerate his oral secretions.  He did try to eat to try to dislodge the fishbone but states that is still present.  He denies any difficulty breathing or shortness of breath.  He denies any other symptoms at this time.    Nursing Notes were all reviewed and agreed with or any disagreements were addressed in the HPI.    REVIEW OF SYSTEMS :      Review of Systems   HENT:          Foreign body throat.       Positives and Pertinent negatives as per HPI.     SURGICAL HISTORY   History reviewed. No pertinent surgical history.    CURRENTMEDICATIONS       Discharge Medication List as of 5/14/2025  7:49 PM        CONTINUE these medications which have NOT CHANGED    Details   ondansetron (ZOFRAN ODT) 4 MG disintegrating tablet Take 1 tablet by mouth every 8 hours as needed for Nausea, Disp-20 tablet, R-0Normal      fluticasone (FLONASE) 50 MCG/ACT nasal spray 2 sprays by Each Nostril route daily for the first 3 days, then decrease to 1 spray in each

## 2025-05-14 NOTE — PROGRESS NOTES
Patient admitted to PACU # 4 from OR at 1933 post LARYNGOSCOPY DIRECT WITH FOREIGN BODY REMOVAL  per Dr Tello.  Attached to PACU monitoring system and report received from anesthesia provider.  Patient was reported to be hemodynamically stable during procedure.  Patient drowsy on admission and denied pain.

## 2025-05-14 NOTE — DISCHARGE INSTRUCTIONS
Eat a soft diet for 24 hours  Alternate Tylenol and ibuprofen for pain  It is normal to feel foreign body sensation in the throat after removal.  If it is persistent after the next 24 to 48 hours, please contact our office.    Other instructions:   Please go to the Emergency Room or call our clinic if you experience any of the following:   · worsening pain, nausea, or vomiting not relieved by medications.   · Temperature greater than 101.5? F.   · Chest pain, shortness of breath, persistent dizziness, swelling in one or both legs.  · Sudden changes in voice, swallowing, or vision.

## 2025-05-14 NOTE — OP NOTE
Operative Note      Patient: Dino Nguyen  YOB: 1955  MRN: [id number]    Date of Procedure: 5/14/2025    Pre-Op Diagnosis Codes:      *Airway foreign body    Post-Op Diagnosis: Same       Procedure(s):  LARYNGOSCOPY DIRECT WITH FOREIGN BODY REMOVAL    Surgeon(s):  Jj Tello MD    Assistant:   Surgical Assistant: Van Villa    Anesthesia: General    Estimated Blood Loss (mL): Minimal    Complications: None    Specimens:   * No specimens in log *    Implants:  * No implants in log *      Drains: * No LDAs found *    Findings:  Infection Present At Time Of Surgery (PATOS) (choose all levels that have infection present):  No infection present  Other Findings: Large 3 cm fishbone lodged in the right and left inferior tonsillar fossa crossing the vallecula.  Removed without difficulty.  No residual foreign bodies noted    Detailed Description of Procedure:   This is a 70-year-old gentleman who had concern for ingestion of a fishbone earlier today.  He had foreign body sensation in his throat.  He was taken urgently to the OR for evaluation and removal after informed consent was signed.    Patient was brought to the operating room and general anesthesia was obtained via endotracheal intubation.  I then performed direct laryngoscopy.  It was immediately apparent that there was a fishbone lodged between the 2 inferior aspects of the tonsils spanning the vallecula.  Under direct visualization the fishbone was gently removed.  There was minimal bleeding.  The rest of the upper aerodigestive tract was visualized and there was no evidence of further foreign bodies.  Patient tolerated the procedure well and was handed back to anesthesia in good condition.  There were no complications.    Electronically signed by Jj Tello MD on 5/14/2025 at 7:28 PM

## 2025-05-14 NOTE — ED TRIAGE NOTES
Pt swallowed part of fish bone around 10 minutes ago. Pt says that bone is stuck on right side of throat and is making it painful to swallow. Pt denies shortness of breath and no signs of distress.

## 2025-05-14 NOTE — H&P
disease  HEME: No coagulation disorder, no bleeding disorder  NEURO: no TIA or stroke-like symptoms  SKIN: No new rashes in the head and neck, no recent skin cancers  MOUTH: No new ulcers, no recent teeth infections  GASTROINTESTINAL: No diarrhea, stomach pain  PSYCH: No anxiety, no depression      PHYSICAL EXAM  BP (!) 166/96   Pulse 79   Temp 97.7 °F (36.5 °C) (Oral)   Resp 17   Ht 1.88 m (6' 2\")   Wt 136 kg (299 lb 13.2 oz)   SpO2 99%   BMI 38.50 kg/m²     GENERAL: No Acute Distress, Alert and Oriented, no hoarseness  EYES: EOMI, Anti-icteric  NOSE: No epistaxis, nasal mucosa within normal limits, no purulent drainage  EARS: Normal external canal appearance, EAC patent bilaterally  FACE: 1/6 House-Brackmann Scale, symmetric, sensation equal bilaterally  ORAL CAVITY: No masses or lesions palpated, uvula is midline, moist mucous membranes, I examined the oropharynx.  There was a possible foreign body in the right tonsil.  This was probed with a hemostat.  It was some debris consistent with tonsil lithiasis.  NECK: Normal range of motion, no thyromegaly, trachea is midline, no lymphadenopathy, no neck masses, no crepitus  CHEST: Normal respiratory effort, no retractions, breathing comfortably  SKIN: No rashes, normal appearing skin, no evidence of skin lesions/tumors    LABS  Lab Results   Component Value Date    WBC 7.7 05/14/2025    HGB 13.8 05/14/2025    HCT 40.3 (L) 05/14/2025    MCV 93.7 05/14/2025     05/14/2025           RADIOLOGY  Summary of findings:  I independently reviewed and interpreted the patient's x-ray of the neck.  It appears to show a radio opaque foreign body in the epiglottis or vallecula.      ASSESSMENT/PLAN  Orrion is a very pleasant 70 y.o. male with concern for foreign body in the throat.    Attempted to remove the foreign body at bedside.  Was unable to obtain adequate access for removal.  Lengthy discussion was had with the patient regarding treatment options.  I

## 2025-05-14 NOTE — ANESTHESIA PRE PROCEDURE
Department of Anesthesiology  Preprocedure Note       Name:  Dino Nguyen   Age:  70 y.o.  :  1955                                          MRN:  [id number]         Date:  2025      Surgeon: Surgeon(s):  Jj Tello MD    Procedure: Procedure(s):  LARYNGOSCOPY DIRECT WITH FOREIGN BODY REMOVAL    Medications prior to admission:   Prior to Admission medications    Medication Sig Start Date End Date Taking? Authorizing Provider   ondansetron (ZOFRAN ODT) 4 MG disintegrating tablet Take 1 tablet by mouth every 8 hours as needed for Nausea 12/15/21   Moise Macias PA-C   fluticasone (FLONASE) 50 MCG/ACT nasal spray 2 sprays by Each Nostril route daily for the first 3 days, then decrease to 1 spray in each nostril once daily. 12/15/21   Moise Macias PA-C   atenolol (TENORMIN) 50 MG tablet Take 2 tablets by mouth daily 9/21/15   Madi Ny MD   tadalafil (CIALIS) 20 MG tablet Take 1 tablet by mouth as needed for Erectile Dysfunction 9/21/15   Madi Ny MD   celecoxib (CELEBREX) 200 MG capsule Take 1 capsule by mouth 2 times daily 9/21/15   Madi Ny MD   allopurinol (ZYLOPRIM) 300 MG tablet Take 1 tablet by mouth daily 9/21/15   Madi Ny MD   vitamin D (ERGOCALCIFEROL) 22934 UNITS CAPS capsule Take 1 capsule by mouth once a week 9/21/15   Madi Ny MD   lisinopril (PRINIVIL;ZESTRIL) 20 MG tablet Take 1.5 tablets by mouth daily 9/21/15   Madi Ny MD   amLODIPine (NORVASC) 5 MG tablet Take 1 tablet by mouth daily 9/21/15   Madi Ny MD   indomethacin (INDOCIN) 50 MG capsule Take 1 capsule by mouth 3 times daily 9/21/15   Madi Ny MD   vitamin D (CHOLECALCIFEROL) 13655 UNIT CAPS Take 1 capsule by mouth daily. 13  Madi Ny MD       Current medications:    No current facility-administered medications for this encounter.       Allergies:  No Known Allergies    Problem List:    Patient Active Problem List   Diagnosis Code     [FreeTextEntry1] : Updated CPAP compliance not available

## 2025-05-15 NOTE — PROGRESS NOTES
Pt ready to dc. Awake and vitals stable. Able to tolerate water. Iv removed no issues. Dc instructions went over with son. All questions answered. Will monitor. Denies pain

## 2025-05-15 NOTE — ANESTHESIA POSTPROCEDURE EVALUATION
Department of Anesthesiology  Postprocedure Note    Patient: Dino Nguyen  MRN: [id number]  YOB: 1955  Date of evaluation: 5/14/2025    Procedure Summary       Date: 05/14/25 Room / Location: [address]    Anesthesia Start: 1907 Anesthesia Stop: 1938    Procedure: LARYNGOSCOPY DIRECT WITH FOREIGN BODY REMOVAL (Throat) Diagnosis:       Foreign body in esophagus, initial encounter      (Foreign body in esophagus, initial encounter [T18.108A])    Surgeons: Jj Tello MD Responsible Provider: Enmanuel Summers MD    Anesthesia Type: General ASA Status: 3            Anesthesia Type: General    Catie Phase I: Catie Score: 10    Catie Phase II: Catie Score: 10    Anesthesia Post Evaluation    Patient location during evaluation: PACU  Patient participation: complete - patient participated  Level of consciousness: awake and alert  Pain score: 0  Nausea & Vomiting: no nausea  Cardiovascular status: hemodynamically stable  Respiratory status: acceptable  Hydration status: stable  Pain management: adequate    No notable events documented.

## (undated) DEVICE — MERCY HEALTH WEST TURNOVER: Brand: MEDLINE INDUSTRIES, INC.

## (undated) DEVICE — GUARD TOOTH SM

## (undated) DEVICE — GLOVE ORTHO 7 1/2   MSG9475

## (undated) DEVICE — ENT: Brand: MEDLINE INDUSTRIES, INC.

## (undated) DEVICE — TUBING, SUCTION, 1/4" X 12', STRAIGHT: Brand: MEDLINE